# Patient Record
(demographics unavailable — no encounter records)

---

## 2025-04-18 NOTE — ASSESSMENT
[FreeTextEntry1] : Discussion regarding all options and risks To undergo repair of LIH on 5/15/25 All lab values and imaging studies reviewed Discussed with Medicine

## 2025-04-18 NOTE — HISTORY OF PRESENT ILLNESS
[de-identified] : This 47 year old man has been aware of a LIH for the past  year. He denies any recent change in bowel habits nor blood in the stool. He underwent repair of a RIH in the distant past

## 2025-04-18 NOTE — PHYSICAL EXAM
[Normal Breath Sounds] : Normal breath sounds [Normal Heart Sounds] : normal heart sounds [Normal Rate and Rhythm] : normal rate and rhythm [Abdomen Tenderness] : ~T ~M No abdominal tenderness [No Rash or Lesion] : No rash or lesion [de-identified] : nl [de-identified] : nl [de-identified] : Reducible LIH [de-identified] : nl